# Patient Record
Sex: MALE | Race: WHITE | Employment: UNEMPLOYED | URBAN - METROPOLITAN AREA
[De-identification: names, ages, dates, MRNs, and addresses within clinical notes are randomized per-mention and may not be internally consistent; named-entity substitution may affect disease eponyms.]

---
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
## 2024-04-25 ENCOUNTER — TELEPHONE (OUTPATIENT)
Age: 19
End: 2024-04-25

## 2024-04-29 NOTE — PROGRESS NOTES
[facility]  2024    Stanley Tan (:  2005) is a 18 y.o. male, here for evaluation of the following medical concerns:    Chief Complaint   Patient presents with    Lower Back Pain        ASSESSMENT/ PLAN  1. Acute right-sided low back pain without sciatica  Likely muscle strain - HEP provided to complete over 4-6 weeks with muscle relaxer to help with night time symptoms. May try prednisone to decrease inflammation. May also use tylenol, ibuprofen and heat.   Follow up in 4-6 weeks to gauge improvement, sooner if needed.   If no improvement, consider imaging or referral to PT.   - methylPREDNISolone (MEDROL DOSEPACK) 4 MG tablet; Take by mouth.  Dispense: 1 kit; Refill: 0  - cyclobenzaprine (FLEXERIL) 5 MG tablet; Take 1 tablet by mouth nightly as needed for Muscle spasms  Dispense: 30 tablet; Refill: 0       No follow-ups on file.    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 He will lift all of the bags off the trailer while it is moving. Normally, he will have a sore lower back for a a handful of days and then it will resolve on its own.     4 weeks ago he did this job and had to work fast. He felt the soreness creep up that night. Lower right paraspinal area. It is still bothering however. Took a break from tennis and lifting in the gym for 2 weeks and it has since improve but it is still bothering him - 4/10 at it's worst. 6/10 if he moves the wrong way.     He is having a hard time getting comfortable at night to sleep.     Has not tried anything over the counter.     No red flags: denies loss of bowel or bladder control, radiation of pain down leg(s), numbness or weakness of leg(s).      ROS  Review of Systems   Constitutional:  Negative for activity change, chills, fatigue and fever.   HENT:  Negative

## 2024-04-30 ENCOUNTER — OFFICE VISIT (OUTPATIENT)
Age: 19
End: 2024-04-30
Payer: COMMERCIAL

## 2024-04-30 VITALS
DIASTOLIC BLOOD PRESSURE: 76 MMHG | BODY MASS INDEX: 31.69 KG/M2 | HEART RATE: 78 BPM | OXYGEN SATURATION: 98 % | RESPIRATION RATE: 16 BRPM | WEIGHT: 234 LBS | HEIGHT: 72 IN | TEMPERATURE: 98 F | SYSTOLIC BLOOD PRESSURE: 118 MMHG

## 2024-04-30 DIAGNOSIS — M54.50 ACUTE RIGHT-SIDED LOW BACK PAIN WITHOUT SCIATICA: Primary | ICD-10-CM

## 2024-04-30 PROCEDURE — 99213 OFFICE O/P EST LOW 20 MIN: CPT | Performed by: FAMILY MEDICINE

## 2024-04-30 RX ORDER — METHYLPREDNISOLONE 4 MG/1
TABLET ORAL
Qty: 1 KIT | Refills: 0 | Status: SHIPPED | OUTPATIENT
Start: 2024-04-30

## 2024-04-30 RX ORDER — CYCLOBENZAPRINE HCL 5 MG
5 TABLET ORAL NIGHTLY PRN
Qty: 30 TABLET | Refills: 0 | Status: SHIPPED | OUTPATIENT
Start: 2024-04-30 | End: 2024-05-30

## 2024-04-30 SDOH — ECONOMIC STABILITY: FOOD INSECURITY: WITHIN THE PAST 12 MONTHS, YOU WORRIED THAT YOUR FOOD WOULD RUN OUT BEFORE YOU GOT MONEY TO BUY MORE.: NEVER TRUE

## 2024-04-30 SDOH — ECONOMIC STABILITY: INCOME INSECURITY: HOW HARD IS IT FOR YOU TO PAY FOR THE VERY BASICS LIKE FOOD, HOUSING, MEDICAL CARE, AND HEATING?: NOT HARD AT ALL

## 2024-04-30 SDOH — ECONOMIC STABILITY: FOOD INSECURITY: WITHIN THE PAST 12 MONTHS, THE FOOD YOU BOUGHT JUST DIDN'T LAST AND YOU DIDN'T HAVE MONEY TO GET MORE.: NEVER TRUE

## 2024-04-30 SDOH — ECONOMIC STABILITY: HOUSING INSECURITY
IN THE LAST 12 MONTHS, WAS THERE A TIME WHEN YOU DID NOT HAVE A STEADY PLACE TO SLEEP OR SLEPT IN A SHELTER (INCLUDING NOW)?: NO

## 2024-04-30 ASSESSMENT — PATIENT HEALTH QUESTIONNAIRE - PHQ9
SUM OF ALL RESPONSES TO PHQ QUESTIONS 1-9: 0
SUM OF ALL RESPONSES TO PHQ QUESTIONS 1-9: 0
1. LITTLE INTEREST OR PLEASURE IN DOING THINGS: NOT AT ALL
SUM OF ALL RESPONSES TO PHQ QUESTIONS 1-9: 0
SUM OF ALL RESPONSES TO PHQ9 QUESTIONS 1 & 2: 0
SUM OF ALL RESPONSES TO PHQ QUESTIONS 1-9: 0
2. FEELING DOWN, DEPRESSED OR HOPELESS: NOT AT ALL

## 2024-04-30 ASSESSMENT — ENCOUNTER SYMPTOMS
WHEEZING: 0
CHEST TIGHTNESS: 0
ABDOMINAL PAIN: 0
COUGH: 0
BACK PAIN: 1
EYE REDNESS: 0
DIARRHEA: 0
SHORTNESS OF BREATH: 0

## 2025-02-03 ENCOUNTER — TELEPHONE (OUTPATIENT)
Facility: CLINIC | Age: 20
End: 2025-02-03

## 2025-02-03 NOTE — TELEPHONE ENCOUNTER
Return [place] phone call in reference to possible referral for PT/orthopedics for possible knee pain. The mother of Stanley answered the phone stating that she was told that Dr. Olivera would referr Stanley to PT and or a orthopedic for knee pain (left knee). Advised that this can be handle several way, one schedule an appointment with Dr. Olivera for his knee issues, or Dr. Olivera may refer Stanley at his discretion. However, since Dr. Olivera has not seen him since 2023 nor for his knee issues there is a possibility that Dr. Olivera may want to see him. Mom explain that he lives in [facility] dorms, he is a , hat he has to walk continually up 3 flights of stairs to get to his dorm room and the knee pain is killing him.     Stanley has been scheduled for 2:00 pm Tuesday 02/04/2025 with Dr. Olivera

## 2025-02-03 NOTE — TELEPHONE ENCOUNTER
----- Message from Sonido BERNAL sent at 2/3/2025  9:51 AM EST -----  Regarding: [facility] Referral Request  [facility] Referral Request    Reason for referral request: Specialty Provider    Specialist/Lab/Test patient is requesting (if known):Physical Therapy     Specialist Phone Number (if applicable):      --------------------------------------------------------------------------------------------------------------------------    Relationship to Patient: Guardian      Call Back Information: OK to leave message on voicemail  Preferred Call Back Number: Phone [phone]

## 2025-02-04 ENCOUNTER — OFFICE VISIT (OUTPATIENT)
Facility: CLINIC | Age: 20
End: 2025-02-04
Payer: COMMERCIAL

## 2025-02-04 VITALS
HEART RATE: 74 BPM | TEMPERATURE: 98.9 F | BODY MASS INDEX: 31.56 KG/M2 | RESPIRATION RATE: 20 BRPM | DIASTOLIC BLOOD PRESSURE: 70 MMHG | OXYGEN SATURATION: 98 % | HEIGHT: 72 IN | SYSTOLIC BLOOD PRESSURE: 126 MMHG | WEIGHT: 233 LBS

## 2025-02-04 DIAGNOSIS — S86.812A PATELLAR TENDON STRAIN, LEFT, INITIAL ENCOUNTER: Primary | ICD-10-CM

## 2025-02-04 PROCEDURE — 99212 OFFICE O/P EST SF 10 MIN: CPT | Performed by: FAMILY MEDICINE

## 2025-02-04 SDOH — ECONOMIC STABILITY: FOOD INSECURITY: WITHIN THE PAST 12 MONTHS, THE FOOD YOU BOUGHT JUST DIDN'T LAST AND YOU DIDN'T HAVE MONEY TO GET MORE.: NEVER TRUE

## 2025-02-04 SDOH — ECONOMIC STABILITY: FOOD INSECURITY: WITHIN THE PAST 12 MONTHS, YOU WORRIED THAT YOUR FOOD WOULD RUN OUT BEFORE YOU GOT MONEY TO BUY MORE.: NEVER TRUE

## 2025-02-04 ASSESSMENT — PATIENT HEALTH QUESTIONNAIRE - PHQ9
2. FEELING DOWN, DEPRESSED OR HOPELESS: NOT AT ALL
SUM OF ALL RESPONSES TO PHQ QUESTIONS 1-9: 0
SUM OF ALL RESPONSES TO PHQ9 QUESTIONS 1 & 2: 0
SUM OF ALL RESPONSES TO PHQ QUESTIONS 1-9: 0
1. LITTLE INTEREST OR PLEASURE IN DOING THINGS: NOT AT ALL

## 2025-02-04 NOTE — PATIENT INSTRUCTIONS

## 2025-02-04 NOTE — PROGRESS NOTES
Chief Complaint   Patient presents with    Knee Pain     C/O left knee pain due to walking up and down 3 flights of stairs daily at [employer]       \"Have you been to the ER, urgent care clinic since your last visit?  Hospitalized since your last visit?\"    NO    “Have you seen or consulted any other health care providers outside our system since your last visit?”    NO    No updated immunization noted on [place] Immunization Registry as of 02/03/2025

## 2025-02-04 NOTE — PROGRESS NOTES
SUBJECTIVE  Chief Complaint   Patient presents with    Knee Pain     C/O left knee pain due to walking up and down 3 flights of stairs daily at [employer]      The patient presents complaining of 3-4 month history of left knee pain.    Location: patellar tendon  Quality: intermittent, improving  Injury: start after he jumped onto a straightened and locked left knee in Oct.  Radiation: denies   Swelling: denies   Instability: may occasionally experience quad inhibition   Locking: denies   Aggravating factors: stairs  Relieving factors: time, avoiding leg lifting (deadlifts, squats)  History of similar: denies  Has tried: activity mods  X-rays: not yet  PT: not yet    OBJECTIVE    Blood pressure 126/70, pulse 74, temperature 98.9 °F (37.2 °C), temperature source Skin, resp. rate 20, height 1.829 m (6'), weight 105.7 kg (233 lb), SpO2 98%.  General:  alert, cooperative, well appearing, in no apparent distress.  Musculoskeletal:  Gait is normal.   Left Knee:  Inspection of the knee demonstrates there is no obvious deformity.  There is no effusion.  There is no evidence of dislocation.  There is normal flexion and extension of the bilateral knee.  Assessment of ligamentous stability demonstrates the ACL/PCL/LCL/MCL are all intact.  There is no pain with varus or valgus strain.  Palpation demonstrates there is no medial or lateral joint line tenderness.  There is no tenderness of the pes anserine bursa.  There is no tenderness along the tibial tuberosity but there is mild patellar tendon tenderness.  There is no tenderness under the medial facet of the patella.  There is no apprehension with patellar subluxation. There is no pain with rocking or grinding.  The bilateral knee demonstrates normal muscle tone with 5/5 strength with flexion extension.    Skin:  There is no redness or warmth.  No local rashes.  There is no bruising.    Psych: normal affect.  Mood good.  Oriented x 3.  Judgement and insight intact.     ASSESSMENT /